# Patient Record
Sex: FEMALE | URBAN - METROPOLITAN AREA
[De-identification: names, ages, dates, MRNs, and addresses within clinical notes are randomized per-mention and may not be internally consistent; named-entity substitution may affect disease eponyms.]

---

## 2021-12-22 ENCOUNTER — APPOINTMENT (RX ONLY)
Dept: URBAN - METROPOLITAN AREA CLINIC 55 | Facility: CLINIC | Age: 51
Setting detail: DERMATOLOGY
End: 2021-12-22

## 2021-12-22 DIAGNOSIS — Z41.9 ENCOUNTER FOR PROCEDURE FOR PURPOSES OTHER THAN REMEDYING HEALTH STATE, UNSPECIFIED: ICD-10-CM

## 2021-12-22 PROCEDURE — ? SCULPTRA

## 2021-12-22 PROCEDURE — ? FILLERS

## 2021-12-22 PROCEDURE — ? DYSPORT

## 2021-12-22 NOTE — PROCEDURE: DYSPORT
Additional Area 4 Units: 0
Show Levator Superior Units: Yes
Post-Care Instructions: After care instructions were provided to the patient.
Show Ucl Units: No
Expiration Date (Month Year): 06/30/21
Additional Area 1 Location: upper lip
Detail Level: Simple
Dilution (U/0.1 Cc): 10
Glabellar Complex Units: 30
Additional Area 2 Location: chin
Lot #: [id number]
Consent: Written consent was obtained.

## 2021-12-22 NOTE — PROCEDURE: FILLERS
Vermilion Lips Filler Volume In Cc: 0
Anesthesia Type: 1% lidocaine with epinephrine
Include Cannula Information In Note?: No
Lot #: [id number]
Anesthesia Volume In Cc: 0.5
Filler: Juvederm Voluma XC
Detail Level: Simple
Include Cannula Information In Note?: Yes
Consent: Written consent was obtained.
Post-Care Instructions: After care instructions were provided to the patient.
Expiration Date (Month Year): 11/2023
Include Cannula Size?: 27G
Filler: Voluma
Map Statment: See Attach Map for Complete Details

## 2021-12-22 NOTE — PROCEDURE: SCULPTRA
Show Right And Left Dorsal Hands Volume?: No
Right Forehead Filler Volume In Cc: 0
Show Lateral Face Volume?: Yes
Volumizer: Sculptra
Injection Technique: The Sculptra was injected to the areas shown in black with a 25g cannula after prepping the skin with alcohol and/or chlorhexidine and providing appropriate anesthesia.
Additional Anesthesia Volume In Cc: 6
Vials Reconstituted (Required For Inventory): 1
Post-Care Instructions: After care instructions were provided to the patient.
Anesthesia Type: 1% lidocaine with epinephrine
Expiration Date (Month Year): 1/24
Dilution Method: The Sculptra was reconstituted with 8cc sterile water and 2cc 2% plain lidocaine for each vial.
Lot #: [id number]
Anesthesia Volume In Cc: 0.5
Detail Level: Simple
Consent: Written consent was obtained.
Map Statement: See Attached Map for Complete Details.

## 2022-02-17 ENCOUNTER — APPOINTMENT (RX ONLY)
Dept: URBAN - METROPOLITAN AREA CLINIC 55 | Facility: CLINIC | Age: 52
Setting detail: DERMATOLOGY
End: 2022-02-17

## 2022-02-17 DIAGNOSIS — Z41.9 ENCOUNTER FOR PROCEDURE FOR PURPOSES OTHER THAN REMEDYING HEALTH STATE, UNSPECIFIED: ICD-10-CM

## 2022-02-17 PROCEDURE — ? NOVATHREADS

## 2022-02-17 NOTE — PROCEDURE: NOVATHREADS
Anesthesia Method: local infiltration
Location 1: lower eyelids
Third Anesthesia Volume In Cc (Optional): 0
Postcare Statement Text: There were no complications and the patient was given postcare instructions and ice packs.
Second Anesthesia Type: 1% lidocaine with epinephrine
Post-Care Instructions (For The Patient Hand-Out): Post care instructions were reviewed and a handout was provided.
Pre-Procedure Text: The treatment areas were cleaned with alcohol and chlorhexidine. Insertion points were mapped and anesthetized.
Add Another Thread?: no
Price (Use Numbers Only, No Special Characters Or $): 803
Treatment Number (Optional): 1
Detail Level: Zone
Consent was obtained.
Number Of Threads: 16
Procedure Text: An 27 gauge needle was used to create the insertions points and the NovaThreads cannulas with absorbable sutures were inserted subcutaneously as shown.
Thread Type 1: Smooth Thread, Blunt Cannula

## 2022-05-13 ENCOUNTER — APPOINTMENT (RX ONLY)
Dept: URBAN - METROPOLITAN AREA CLINIC 55 | Facility: CLINIC | Age: 52
Setting detail: DERMATOLOGY
End: 2022-05-13

## 2022-05-13 DIAGNOSIS — Z41.9 ENCOUNTER FOR PROCEDURE FOR PURPOSES OTHER THAN REMEDYING HEALTH STATE, UNSPECIFIED: ICD-10-CM

## 2022-05-13 PROCEDURE — ? FILLERS

## 2022-05-13 NOTE — PROCEDURE: FILLERS
Additional Area 3 Volume In Cc: 0
Anesthesia Volume In Cc: 0.5
Expiration Date (Month Year): 12/21/2022
Map Statment: See Attach Map for Complete Details
Include Cannula Size?: 27G
Include Cannula Information In Note?: Yes
Include Cannula Information In Note?: No
Post-Care Instructions: After care instructions were provided to the patient.
Filler: Juvederm Voluma XC
Lot #: [id number]
Detail Level: Simple
Anesthesia Type: 1% lidocaine with epinephrine
Consent: Written consent was obtained.

## 2022-11-15 ENCOUNTER — APPOINTMENT (RX ONLY)
Dept: URBAN - METROPOLITAN AREA CLINIC 55 | Facility: CLINIC | Age: 52
Setting detail: DERMATOLOGY
End: 2022-11-15

## 2022-11-15 DIAGNOSIS — Z41.9 ENCOUNTER FOR PROCEDURE FOR PURPOSES OTHER THAN REMEDYING HEALTH STATE, UNSPECIFIED: ICD-10-CM

## 2022-11-15 PROCEDURE — ? INVENTORY

## 2022-11-15 PROCEDURE — ? FILLERS

## 2022-11-15 PROCEDURE — ? HYALURONIDASE INJECTION

## 2022-11-15 PROCEDURE — ? COSMETIC CONSULTATION: GENERAL

## 2022-11-15 ASSESSMENT — LOCATION ZONE DERM: LOCATION ZONE: EYELID

## 2022-11-15 ASSESSMENT — LOCATION DETAILED DESCRIPTION DERM: LOCATION DETAILED: RIGHT MEDIAL INFERIOR EYELID

## 2022-11-15 ASSESSMENT — LOCATION SIMPLE DESCRIPTION DERM: LOCATION SIMPLE: RIGHT INFERIOR EYELID

## 2022-11-15 NOTE — PROCEDURE: HYALURONIDASE INJECTION
Consent: The risks of contour defects and dimpling of the skin were reviewed with the patient prior to the injection.
Total Volume (Ccs): 0.1
Detail Level: Detailed
Hyaluronidase Preparation: hyaluronidase

## 2022-11-15 NOTE — PROCEDURE: FILLERS
Use Map Statement For Sites (Optional): No
Dorsal Hands Filler Volume In Cc: 0
Include Cannula Size?: 27G
Map Statment: See Attach Map for Complete Details
Filler Comments: .5 cc
Filler: Restylane-L
Filler: RHA 3
Anesthesia Type: 1% lidocaine with epinephrine
Detail Level: Detailed
Include Cannula Information In Note?: Yes
Consent was obtained.
Anesthesia Volume In Cc: 0.5
Post-Care Instructions: After care instructions were provided verbally and in writing.

## 2023-07-25 ENCOUNTER — APPOINTMENT (RX ONLY)
Dept: URBAN - METROPOLITAN AREA CLINIC 55 | Facility: CLINIC | Age: 53
Setting detail: DERMATOLOGY
End: 2023-07-25

## 2023-07-25 DIAGNOSIS — Z41.9 ENCOUNTER FOR PROCEDURE FOR PURPOSES OTHER THAN REMEDYING HEALTH STATE, UNSPECIFIED: ICD-10-CM

## 2023-07-25 PROCEDURE — ? INVENTORY

## 2023-07-25 PROCEDURE — ? THERMAGE

## 2023-07-25 PROCEDURE — ? SCULPTRA

## 2023-07-25 ASSESSMENT — LOCATION DETAILED DESCRIPTION DERM
LOCATION DETAILED: RIGHT LATERAL SUPERIOR EYELID
LOCATION DETAILED: LEFT LATERAL SUPERIOR EYELID

## 2023-07-25 ASSESSMENT — LOCATION SIMPLE DESCRIPTION DERM
LOCATION SIMPLE: LEFT SUPERIOR EYELID
LOCATION SIMPLE: RIGHT SUPERIOR EYELID

## 2023-07-25 ASSESSMENT — LOCATION ZONE DERM: LOCATION ZONE: EYELID

## 2023-07-25 NOTE — PROCEDURE: SCULPTRA
Show Right And Left Jawline Volume?: No
Additional Area 2 Volume In Cc: 0
Post-Care Instructions: Patient instructed to apply ice to reduce swelling.
Show Map Statement?: Yes
Vials Reconstituted (Required For Inventory): 1
Map Statement: See Attached Map for Complete Details.
Anesthesia Type: 1% lidocaine with epinephrine
Dilution Method: The Sculptra was diluted with 8 ml of sterile water and 2 ml 1% lidocaine for a total volume of 10ccs for each vial.
Injection Technique: The Sculptra was injected to the listed areas after cleansing the skin and providing appropriate anesthesia.
Detail Level: Detailed
Anesthesia Volume In Cc: 0.5
Consent was obtained.
Show Inventory Tab: Hide
Additional Anesthesia Volume In Cc: 6

## 2023-07-25 NOTE — PROCEDURE: THERMAGE
Immediate Post-Procedure Findings: mild erythema, no edema
Consent obtained, risks reviewed.
Treatment Number: 1
Minimum Treatment Setting: 10
Repetitions: 500
Treatment Endpoint: visual tightening
Patient Discomfort: no
Post-Procedures Photographs: Yes
Post-Care Instructions: After care instructions reviewed with patient.
Detail Level: Zone
Post-Procedure Text: Physical sunscreen applied to skin.
Thermage Tip: Body Tip 16.0

## 2024-04-02 ENCOUNTER — APPOINTMENT (RX ONLY)
Dept: URBAN - METROPOLITAN AREA CLINIC 55 | Facility: CLINIC | Age: 54
Setting detail: DERMATOLOGY
End: 2024-04-02

## 2024-04-02 DIAGNOSIS — Z41.9 ENCOUNTER FOR PROCEDURE FOR PURPOSES OTHER THAN REMEDYING HEALTH STATE, UNSPECIFIED: ICD-10-CM

## 2024-04-02 PROCEDURE — ? FILLERS

## 2024-04-02 PROCEDURE — ? INVENTORY

## 2024-04-02 NOTE — PROCEDURE: FILLERS
Brows Filler Volume In Cc: 0
Detail Level: Detailed
Include Cannula Size?: 27G
Post-Care Instructions: After care instructions were provided verbally and in writing.
Consent was obtained.
Use Map Statement For Sites (Optional): No
Filler: Juvederm Ultra Plus XC
Map Statment: See Attach Map for Complete Details
Filler: Juvederm Voluma XC
Include Cannula Information In Note?: Yes
Anesthesia Type: 1% lidocaine with epinephrine
Anesthesia Volume In Cc: 0.5

## 2024-10-08 ENCOUNTER — APPOINTMENT (RX ONLY)
Dept: URBAN - METROPOLITAN AREA CLINIC 55 | Facility: CLINIC | Age: 54
Setting detail: DERMATOLOGY
End: 2024-10-08

## 2024-10-08 DIAGNOSIS — Z41.9 ENCOUNTER FOR PROCEDURE FOR PURPOSES OTHER THAN REMEDYING HEALTH STATE, UNSPECIFIED: ICD-10-CM

## 2024-10-08 PROCEDURE — ? INVENTORY

## 2024-10-08 PROCEDURE — ? HYALURONIDASE INJECTION

## 2024-10-08 ASSESSMENT — LOCATION SIMPLE DESCRIPTION DERM: LOCATION SIMPLE: RIGHT CHEEK

## 2024-10-08 ASSESSMENT — LOCATION ZONE DERM: LOCATION ZONE: FACE

## 2024-10-08 ASSESSMENT — LOCATION DETAILED DESCRIPTION DERM: LOCATION DETAILED: RIGHT SUPERIOR MEDIAL MALAR CHEEK

## 2024-10-08 NOTE — PROCEDURE: HYALURONIDASE INJECTION
Consent: The risks of contour defects and dimpling of the skin were reviewed with the patient prior to the injection.
Hyaluronidase Preparation: hyaluronidase
Lot # (Optional): [id number]
Expiration Date (Optional): 06/2026
Detail Level: Detailed
Total Volume (Ccs): 0.1

## 2024-12-12 ENCOUNTER — APPOINTMENT (OUTPATIENT)
Dept: URBAN - METROPOLITAN AREA CLINIC 55 | Facility: CLINIC | Age: 54
Setting detail: DERMATOLOGY
End: 2024-12-12

## 2024-12-12 DIAGNOSIS — Z41.9 ENCOUNTER FOR PROCEDURE FOR PURPOSES OTHER THAN REMEDYING HEALTH STATE, UNSPECIFIED: ICD-10-CM

## 2024-12-12 PROCEDURE — ? INVENTORY

## 2024-12-12 PROCEDURE — ? HYALURONIDASE INJECTION

## 2024-12-12 PROCEDURE — ? DYSPORT

## 2024-12-12 ASSESSMENT — LOCATION DETAILED DESCRIPTION DERM: LOCATION DETAILED: RIGHT SUPERIOR CENTRAL MALAR CHEEK

## 2024-12-12 ASSESSMENT — LOCATION SIMPLE DESCRIPTION DERM: LOCATION SIMPLE: RIGHT CHEEK

## 2024-12-12 ASSESSMENT — LOCATION ZONE DERM: LOCATION ZONE: FACE

## 2024-12-12 NOTE — PROCEDURE: DYSPORT
Masseter Units: 0
Show Additional Area 3: Yes
Show Ucl Units: No
Consent was obtained.
Post-Care Instructions: Patient instructed to not lie down for 4 hours and limit physical activity for 24 hours.
Dilution (U/0.1 Cc): 10
Detail Level: Detailed
Additional Area 1 Location: Chin
Glabellar Complex Units: 30
Show Inventory Tab: Show

## 2024-12-12 NOTE — PROCEDURE: HYALURONIDASE INJECTION
Consent: The risks of contour defects and dimpling of the skin were reviewed with the patient prior to the injection.
Hyaluronidase Preparation: hyaluronidase
Total Volume (Ccs): 0.2
Lot # (Optional): [id number]
Detail Level: Detailed
Expiration Date (Optional): 09/2025